# Patient Record
Sex: MALE | Race: WHITE | Employment: OTHER | URBAN - METROPOLITAN AREA
[De-identification: names, ages, dates, MRNs, and addresses within clinical notes are randomized per-mention and may not be internally consistent; named-entity substitution may affect disease eponyms.]

---

## 2021-09-03 ENCOUNTER — APPOINTMENT (OUTPATIENT)
Dept: GENERAL RADIOLOGY | Age: 56
End: 2021-09-03
Payer: COMMERCIAL

## 2021-09-03 ENCOUNTER — HOSPITAL ENCOUNTER (EMERGENCY)
Age: 56
Discharge: HOME | End: 2021-09-03
Attending: STUDENT IN AN ORGANIZED HEALTH CARE EDUCATION/TRAINING PROGRAM
Payer: COMMERCIAL

## 2021-09-03 VITALS
OXYGEN SATURATION: 99 % | SYSTOLIC BLOOD PRESSURE: 145 MMHG | RESPIRATION RATE: 19 BRPM | HEART RATE: 81 BPM | DIASTOLIC BLOOD PRESSURE: 90 MMHG | TEMPERATURE: 98 F

## 2021-09-03 DIAGNOSIS — Z71.1 WORRIED WELL: Primary | ICD-10-CM

## 2021-09-03 PROCEDURE — 74018 RADEX ABDOMEN 1 VIEW: CPT

## 2021-09-03 PROCEDURE — 99283 EMERGENCY DEPT VISIT LOW MDM: CPT

## 2021-09-03 NOTE — ED NOTES
Bed: A11-11  Expected date:   Expected time:   Means of arrival:   Comments:  [address] [place] [address], RN  09/03/21 4946

## 2021-09-04 ASSESSMENT — ENCOUNTER SYMPTOMS
TROUBLE SWALLOWING: 0
BACK PAIN: 0
RESPIRATORY NEGATIVE: 1
NAUSEA: 0
ABDOMINAL PAIN: 0
SHORTNESS OF BREATH: 0
CHEST TIGHTNESS: 0
SORE THROAT: 0
VOMITING: 0

## 2021-09-04 NOTE — ED PROVIDER NOTES
[address] [facility] ENCOUNTER          PHYSICIAN ASSISTANT NOTE       Date of evaluation: 9/3/2021    Chief Complaint     Swallowed Foreign Body (pt states, \"I swallowed a pop tab. \" states it was about 30 minutes ago.)      History of Present Illness     Zulema Pérez is a 48 y.o. male who presents to the Emergency Department following possible ingestion of a metal pop tab. Patient states that around 5:00pm, he accidentally dropped the pop tab into the can, forgot the tab was in the can, and took a drink. Patient thought he \"felt it go down,\" but upon further questioning he is unsure if he actually ingested the tab. His last meal was at 12:00pm and he has not had a bowel movement since the time of possible ingestion. He denies abdominal pain, nausea, and emesis. Review of Systems     Review of Systems   Constitutional: Negative for chills and fever. HENT: Negative. Negative for sore throat and trouble swallowing. Respiratory: Negative. Negative for chest tightness and shortness of breath. Cardiovascular: Negative. Negative for chest pain. Gastrointestinal: Negative for abdominal pain, nausea and vomiting. Musculoskeletal: Negative. Negative for back pain and neck pain. Skin: Negative. Neurological: Negative. Negative for dizziness, weakness, light-headedness, numbness and headaches. All other systems reviewed and are negative. Past Medical, Surgical, Family, and Social History     He has no past medical history on file. He has no past surgical history on file. His family history is not on file. He reports that he has never smoked. He has never used smokeless tobacco. He reports current alcohol use. He reports that he does not use drugs. Medications     There are no discharge medications for this patient. Allergies     He has No Known Allergies.     Physical Exam     INITIAL VITALS: BP: (!) 145/90, Temp: 98 °F (36.7 °C), Pulse: 81, Resp: 19, SpO2: 99 %  Physical Exam  Vitals and nursing note reviewed. Constitutional:       General: He is not in acute distress. Appearance: He is well-developed. HENT:      Head: Normocephalic and atraumatic. Right Ear: External ear normal.      Left Ear: External ear normal.      Nose: Nose normal.      Mouth/Throat:      Mouth: Mucous membranes are moist.   Eyes:      General:         Right eye: No discharge. Left eye: No discharge. Extraocular Movements: Extraocular movements intact. Conjunctiva/sclera: Conjunctivae normal.      Pupils: Pupils are equal, round, and reactive to light. Cardiovascular:      Rate and Rhythm: Normal rate and regular rhythm. Pulses: Normal pulses. Heart sounds: No murmur heard. Pulmonary:      Effort: Pulmonary effort is normal.      Breath sounds: Normal breath sounds. No wheezing or rhonchi. Abdominal:      General: Bowel sounds are normal.      Palpations: Abdomen is soft. Tenderness: There is no abdominal tenderness. There is no guarding or rebound. Musculoskeletal:         General: Normal range of motion. Cervical back: Normal range of motion and neck supple. Skin:     General: Skin is warm and dry. Capillary Refill: Capillary refill takes less than 2 seconds. Neurological:      General: No focal deficit present. Mental Status: He is alert and oriented to person, place, and time. Sensory: No sensory deficit. Deep Tendon Reflexes: Reflexes normal.   Psychiatric:         Mood and Affect: Mood normal.         Behavior: Behavior normal.         Thought Content: Thought content normal.         Judgment: Judgment normal.         Diagnostic Results       RADIOLOGY:  XR ABDOMEN (KUB) (SINGLE AP VIEW)   Final Result      Nonspecific bowel gas pattern. LABS:   No results found for this visit on 09/03/21.         RECENT VITALS:  BP: (!) 145/90, Temp: 98 °F (36.7 °C), Pulse: 81, Resp: 19, SpO2: 99 %     Procedures ED Course     Nursing Notes, Past Medical Hx,Past Surgical Hx, Social Hx, Allergies, and Family Hx were reviewed. The patient was given the following medications:  No orders of the defined types were placed in this encounter. CONSULTS:  None    MEDICAL DECISION MAKING / ASSESSMENT / Robert Serrano is a 48 y.o. male who presented to the emergency department concerned that he had swallowed a pop tab. The patient is unsure whether he actually swallowed the tab but states it did go into his skin. He felt like he swallowed something so he came in for evaluation. He denies any difficulty swallowing or pain with swallowing. Denies abdominal pain, nausea or vomiting. Has no complaints. He had an x-ray ordered that shows no foreign bodies. No air. Has a nonspecific bowel gas pattern. I had a discussion with the patient and he is not even sure he actually swallowed the tap. He states he still has the can at home and will go home and check. He is to follow-up with his primary care physician for evaluation however was given strict return precautions. This patient was also evaluated by the attending physician. All care plans were discussed and agreed upon. Clinical Impression     1. Worried well        Disposition     PATIENT REFERRED TO:  Aniyah Proctor MD  [facility].  [address] [phone]    Call   for follow up      DISCHARGE MEDICATIONS:  There are no discharge medications for this patient.       DISPOSITION Decision To Discharge 09/03/2021 08:36:48 PM     [facility] [place]  09/04/21 0113